# Patient Record
Sex: FEMALE | Race: WHITE | NOT HISPANIC OR LATINO | Employment: FULL TIME | URBAN - METROPOLITAN AREA
[De-identification: names, ages, dates, MRNs, and addresses within clinical notes are randomized per-mention and may not be internally consistent; named-entity substitution may affect disease eponyms.]

---

## 2023-09-19 ENCOUNTER — OFFICE VISIT (OUTPATIENT)
Dept: PRIMARY CARE | Facility: CLINIC | Age: 33
End: 2023-09-19
Payer: COMMERCIAL

## 2023-09-19 VITALS
OXYGEN SATURATION: 98 % | DIASTOLIC BLOOD PRESSURE: 70 MMHG | RESPIRATION RATE: 16 BRPM | HEART RATE: 69 BPM | HEIGHT: 67 IN | WEIGHT: 149 LBS | TEMPERATURE: 98 F | BODY MASS INDEX: 23.39 KG/M2 | SYSTOLIC BLOOD PRESSURE: 128 MMHG

## 2023-09-19 DIAGNOSIS — Z00.00 HEALTHCARE MAINTENANCE: Primary | ICD-10-CM

## 2023-09-19 PROBLEM — G89.29 CHRONIC LOW BACK PAIN: Status: ACTIVE | Noted: 2023-09-19

## 2023-09-19 PROBLEM — L29.9 PRURITUS: Status: RESOLVED | Noted: 2023-09-19 | Resolved: 2023-09-19

## 2023-09-19 PROBLEM — R53.83 FATIGUE: Status: ACTIVE | Noted: 2023-09-19
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
## 2023-09-19 PROBLEM — R51.9 FREQUENT HEADACHES: Status: ACTIVE | Noted: 2023-09-19

## 2023-09-19 PROBLEM — F41.8 DEPRESSION WITH ANXIETY: Status: ACTIVE | Noted: 2023-09-19

## 2023-09-19 PROBLEM — E55.9 VITAMIN D DEFICIENCY: Status: ACTIVE | Noted: 2023-09-19

## 2023-09-19 PROBLEM — R74.01 TRANSAMINITIS: Status: ACTIVE | Noted: 2023-09-19

## 2023-09-19 PROBLEM — S82.899A ANKLE FRACTURE: Status: RESOLVED | Noted: 2023-09-19 | Resolved: 2023-09-19

## 2023-09-19 PROBLEM — R74.01 TRANSAMINITIS: Status: RESOLVED | Noted: 2023-09-19 | Resolved: 2023-09-19

## 2023-09-19 PROBLEM — M85.80 OSTEOPENIA: Status: ACTIVE | Noted: 2023-09-19

## 2023-09-19 PROBLEM — S92.333A FRACTURE OF 3RD METATARSAL: Status: ACTIVE | Noted: 2023-09-19

## 2023-09-19 PROBLEM — L29.9 ITCHING: Status: RESOLVED | Noted: 2023-09-19 | Resolved: 2023-09-19

## 2023-09-19 PROBLEM — J10.1 INFLUENZA A: Status: RESOLVED | Noted: 2023-09-19 | Resolved: 2023-09-19

## 2023-09-19 PROBLEM — J30.9 ALLERGIC RHINITIS: Status: ACTIVE | Noted: 2023-09-19

## 2023-09-19 PROBLEM — S82.899A ANKLE FRACTURE: Status: ACTIVE | Noted: 2023-09-19

## 2023-09-19 PROBLEM — K83.1: Status: ACTIVE | Noted: 2023-09-19

## 2023-09-19 PROBLEM — M79.673 FOOT PAIN: Status: RESOLVED | Noted: 2023-09-19 | Resolved: 2023-09-19

## 2023-09-19 PROBLEM — R11.0 NAUSEA IN ADULT: Status: RESOLVED | Noted: 2023-09-19 | Resolved: 2023-09-19

## 2023-09-19 PROBLEM — M25.671 STIFFNESS OF RIGHT ANKLE JOINT: Status: ACTIVE | Noted: 2023-09-19

## 2023-09-19 PROBLEM — M54.2 NECK PAIN: Status: ACTIVE | Noted: 2023-09-19

## 2023-09-19 PROBLEM — R79.89 ELEVATED LFTS: Status: RESOLVED | Noted: 2023-09-19 | Resolved: 2023-09-19

## 2023-09-19 PROBLEM — J45.909 ASTHMA (HHS-HCC): Status: ACTIVE | Noted: 2023-09-19

## 2023-09-19 PROBLEM — F41.8 DEPRESSION WITH ANXIETY: Status: RESOLVED | Noted: 2023-09-19 | Resolved: 2023-09-19
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
## 2023-09-19 PROBLEM — M25.671 STIFFNESS OF RIGHT ANKLE JOINT: Status: RESOLVED | Noted: 2023-09-19 | Resolved: 2023-09-19

## 2023-09-19 PROBLEM — S93.409A ANKLE SPRAIN: Status: RESOLVED | Noted: 2023-09-19 | Resolved: 2023-09-19

## 2023-09-19 PROBLEM — M85.80 OSTEOPENIA: Status: RESOLVED | Noted: 2023-09-19 | Resolved: 2023-09-19

## 2023-09-19 PROBLEM — J02.9 VIRAL PHARYNGITIS: Status: RESOLVED | Noted: 2023-09-19 | Resolved: 2023-09-19

## 2023-09-19 PROBLEM — R79.89 ELEVATED LFTS: Status: ACTIVE | Noted: 2023-09-19

## 2023-09-19 PROBLEM — S92.333A FRACTURE OF 3RD METATARSAL: Status: RESOLVED | Noted: 2023-09-19 | Resolved: 2023-09-19

## 2023-09-19 PROBLEM — J02.9 VIRAL PHARYNGITIS: Status: ACTIVE | Noted: 2023-09-19

## 2023-09-19 PROBLEM — K83.1: Status: RESOLVED | Noted: 2023-09-19 | Resolved: 2023-09-19

## 2023-09-19 PROBLEM — L29.9 ITCHING: Status: ACTIVE | Noted: 2023-09-19

## 2023-09-19 PROBLEM — L29.9 PRURITUS: Status: ACTIVE | Noted: 2023-09-19

## 2023-09-19 PROBLEM — N94.6 DYSMENORRHEA: Status: ACTIVE | Noted: 2023-09-19

## 2023-09-19 PROBLEM — L30.9 ECZEMA: Status: ACTIVE | Noted: 2023-09-19

## 2023-09-19 PROBLEM — M54.50 CHRONIC LOW BACK PAIN: Status: ACTIVE | Noted: 2023-09-19

## 2023-09-19 PROBLEM — R11.0 NAUSEA IN ADULT: Status: ACTIVE | Noted: 2023-09-19

## 2023-09-19 PROBLEM — R68.89 FLU-LIKE SYMPTOMS: Status: ACTIVE | Noted: 2023-09-19

## 2023-09-19 PROBLEM — M25.519 SHOULDER PAIN: Status: RESOLVED | Noted: 2023-09-19 | Resolved: 2023-09-19

## 2023-09-19 PROBLEM — J01.90 ACUTE SINUSITIS: Status: ACTIVE | Noted: 2023-09-19

## 2023-09-19 PROBLEM — F41.9 ANXIETY: Status: ACTIVE | Noted: 2023-09-19

## 2023-09-19 PROBLEM — M62.838 MUSCLE SPASM: Status: ACTIVE | Noted: 2023-09-19

## 2023-09-19 PROBLEM — J01.90 ACUTE SINUSITIS: Status: RESOLVED | Noted: 2023-09-19 | Resolved: 2023-09-19

## 2023-09-19 PROBLEM — J30.9 ALLERGIC RHINITIS: Status: RESOLVED | Noted: 2023-09-19 | Resolved: 2023-09-19

## 2023-09-19 PROBLEM — M54.2 NECK PAIN: Status: RESOLVED | Noted: 2023-09-19 | Resolved: 2023-09-19

## 2023-09-19 PROBLEM — J10.1 INFLUENZA A: Status: ACTIVE | Noted: 2023-09-19

## 2023-09-19 PROBLEM — S93.409A ANKLE SPRAIN: Status: ACTIVE | Noted: 2023-09-19

## 2023-09-19 PROBLEM — M25.519 SHOULDER PAIN: Status: ACTIVE | Noted: 2023-09-19

## 2023-09-19 PROBLEM — M79.673 FOOT PAIN: Status: ACTIVE | Noted: 2023-09-19

## 2023-09-19 PROBLEM — R68.89 FLU-LIKE SYMPTOMS: Status: RESOLVED | Noted: 2023-09-19 | Resolved: 2023-09-19

## 2023-09-19 PROCEDURE — 99395 PREV VISIT EST AGE 18-39: CPT | Performed by: INTERNAL MEDICINE

## 2023-09-19 PROCEDURE — 1036F TOBACCO NON-USER: CPT | Performed by: INTERNAL MEDICINE

## 2023-09-19 RX ORDER — ALBUTEROL SULFATE 0.83 MG/ML
SOLUTION RESPIRATORY (INHALATION)
COMMUNITY

## 2023-09-19 RX ORDER — CHOLECALCIFEROL (VITAMIN D3) 50 MCG
50 TABLET ORAL DAILY
COMMUNITY

## 2023-09-19 RX ORDER — MULTIVITAMIN
1 TABLET ORAL DAILY
COMMUNITY

## 2023-09-19 RX ORDER — ALBUTEROL SULFATE 90 UG/1
AEROSOL, METERED RESPIRATORY (INHALATION)
COMMUNITY
Start: 2017-11-30

## 2023-09-19 RX ORDER — CYCLOBENZAPRINE HCL 10 MG
1 TABLET ORAL NIGHTLY PRN
COMMUNITY
Start: 2022-04-22

## 2023-09-19 ASSESSMENT — ENCOUNTER SYMPTOMS
SLEEP DISTURBANCE: 1
CONSTIPATION: 0
NERVOUS/ANXIOUS: 1
BACK PAIN: 1
DIARRHEA: 1
FREQUENCY: 0

## 2023-09-19 ASSESSMENT — PATIENT HEALTH QUESTIONNAIRE - PHQ9
2. FEELING DOWN, DEPRESSED OR HOPELESS: NOT AT ALL
SUM OF ALL RESPONSES TO PHQ9 QUESTIONS 1 AND 2: 0
1. LITTLE INTEREST OR PLEASURE IN DOING THINGS: NOT AT ALL

## 2023-09-19 NOTE — PROGRESS NOTES
Patient here for a physical     Subjective   Patient ID: Lindsay Mina is a 33 y.o. female who presents for Annual Exam.    The patient has been undergoing acupuncture therapy for anxiety symptoms and finds this is working very well.  Nevertheless, she wakes up occasionally with panic attacks, and states this is affecting her sleep quality.  In addition, the patient notes intermittent loose stools which she believes is related to the anxiety and stress.    The patient reports slowly improving back pain with occasional muscle spasms.  She suspects this is due to prolonged sitting at her desk.    The patient mentions occasional asthma flare-ups and allergy symptoms.  She has a prescription for albuterol 90 mcg/actuation inhaler which is used as needed.    The patient reports changes in visual acuity and plans to follow-up with Optometry for new prescription lenses.    The patient denies any urinary symptoms.    The patient often travels to [place] for work purposes.       Review of Systems   Eyes:         Positive for change in visual acuity.   Gastrointestinal:  Positive for diarrhea. Negative for constipation.   Genitourinary:  Negative for frequency.   Musculoskeletal:  Positive for back pain.   Psychiatric/Behavioral:  Positive for sleep disturbance. The patient is nervous/anxious.    All other systems reviewed and are negative.    Objective   Physical Exam  Constitutional:       Appearance: Normal appearance.   Neck:      Vascular: No carotid bruit.   Cardiovascular:      Rate and Rhythm: Normal rate and regular rhythm.      Heart sounds: Normal heart sounds.   Pulmonary:      Effort: Pulmonary effort is normal.      Breath sounds: Normal breath sounds.   Abdominal:      General: Bowel sounds are normal.      Palpations: Abdomen is soft.      Tenderness: There is no abdominal tenderness.   Skin:     General: Skin is warm and dry.   Neurological:      General: No focal deficit present.      Mental Status: She  is alert and oriented to person, place, and time. Mental status is at baseline.   Psychiatric:         Mood and Affect: Mood normal.         Behavior: Behavior normal.       Assessment/Plan   Problem List Items Addressed This Visit    None  Visit Diagnoses       Healthcare maintenance    -  Primary    Relevant Orders    Lipid panel    Comprehensive metabolic panel    CBC and Auto Differential    Tsh With Reflex To Free T4 If Abnormal            CPE Performed  -  Discussed healthy diet and regular exercise.    -  Physical exam overall unremarkable. Immunizations reviewed and updated accordingly. Healthy lifestyle choices discussed (tobacco avoidance, appropriate alcohol use, avoidance of illicit substances).   -  Patient is wearing seatbelt.   -  Screening lab work ordered as indicated.    -  Age appropriate screening tests reviewed with patient.        IMPRESSIONS/PLAN:    /70 in the office today.    Asthma  - Maintained with albuterol 90 mcg/actuation inhaler prn and albuterol 2.5mg/3ml nebulizer solution.       Muscle Spasm  - Maintained with cyclobenzaprine 10 mg at bedtime prn.    Health Maintenance  - Routine labs ordered including CBC, CMP, and a lipid panel to be completed in the fasting state.     Follow-up according to routine health maintenance, call sooner if needed.       Scribe Attestation  By signing my name below, IEufemia Scribe   attest that this documentation has been prepared under the direction and in the presence of Kee Dill DO.

## 2023-09-20 ENCOUNTER — LAB (OUTPATIENT)
Dept: LAB | Facility: LAB | Age: 33
End: 2023-09-20
Payer: COMMERCIAL

## 2023-09-20 DIAGNOSIS — Z00.00 HEALTHCARE MAINTENANCE: ICD-10-CM

## 2023-09-20 LAB
ALANINE AMINOTRANSFERASE (SGPT) (U/L) IN SER/PLAS: 12 U/L (ref 7–45)
ALBUMIN (G/DL) IN SER/PLAS: 4.6 G/DL (ref 3.4–5)
ALKALINE PHOSPHATASE (U/L) IN SER/PLAS: 49 U/L (ref 33–110)
ANION GAP IN SER/PLAS: 14 MMOL/L (ref 10–20)
ASPARTATE AMINOTRANSFERASE (SGOT) (U/L) IN SER/PLAS: 17 U/L (ref 9–39)
BASOPHILS (10*3/UL) IN BLOOD BY AUTOMATED COUNT: 0.03 X10E9/L (ref 0–0.1)
BASOPHILS/100 LEUKOCYTES IN BLOOD BY AUTOMATED COUNT: 0.7 % (ref 0–2)
BILIRUBIN TOTAL (MG/DL) IN SER/PLAS: 0.4 MG/DL (ref 0–1.2)
CALCIUM (MG/DL) IN SER/PLAS: 9.5 MG/DL (ref 8.6–10.3)
CARBON DIOXIDE, TOTAL (MMOL/L) IN SER/PLAS: 27 MMOL/L (ref 21–32)
CHLORIDE (MMOL/L) IN SER/PLAS: 104 MMOL/L (ref 98–107)
CHOLESTEROL (MG/DL) IN SER/PLAS: 194 MG/DL (ref 0–199)
CHOLESTEROL IN HDL (MG/DL) IN SER/PLAS: 53 MG/DL
CHOLESTEROL/HDL RATIO: 3.7
CREATININE (MG/DL) IN SER/PLAS: 0.79 MG/DL (ref 0.5–1.05)
EOSINOPHILS (10*3/UL) IN BLOOD BY AUTOMATED COUNT: 0.09 X10E9/L (ref 0–0.7)
EOSINOPHILS/100 LEUKOCYTES IN BLOOD BY AUTOMATED COUNT: 2 % (ref 0–6)
ERYTHROCYTE DISTRIBUTION WIDTH (RATIO) BY AUTOMATED COUNT: 13.5 % (ref 11.5–14.5)
ERYTHROCYTE MEAN CORPUSCULAR HEMOGLOBIN CONCENTRATION (G/DL) BY AUTOMATED: 32.3 G/DL (ref 32–36)
ERYTHROCYTE MEAN CORPUSCULAR VOLUME (FL) BY AUTOMATED COUNT: 90 FL (ref 80–100)
ERYTHROCYTES (10*6/UL) IN BLOOD BY AUTOMATED COUNT: 4.7 X10E12/L (ref 4–5.2)
GFR FEMALE: >90 ML/MIN/1.73M2
GLUCOSE (MG/DL) IN SER/PLAS: 80 MG/DL (ref 74–99)
HEMATOCRIT (%) IN BLOOD BY AUTOMATED COUNT: 42.1 % (ref 36–46)
HEMOGLOBIN (G/DL) IN BLOOD: 13.6 G/DL (ref 12–16)
IMMATURE GRANULOCYTES/100 LEUKOCYTES IN BLOOD BY AUTOMATED COUNT: 0 % (ref 0–0.9)
LDL: 126 MG/DL (ref 0–99)
LEUKOCYTES (10*3/UL) IN BLOOD BY AUTOMATED COUNT: 4.6 X10E9/L (ref 4.4–11.3)
LYMPHOCYTES (10*3/UL) IN BLOOD BY AUTOMATED COUNT: 2.1 X10E9/L (ref 1.2–4.8)
LYMPHOCYTES/100 LEUKOCYTES IN BLOOD BY AUTOMATED COUNT: 46.1 % (ref 13–44)
MONOCYTES (10*3/UL) IN BLOOD BY AUTOMATED COUNT: 0.36 X10E9/L (ref 0.1–1)
MONOCYTES/100 LEUKOCYTES IN BLOOD BY AUTOMATED COUNT: 7.9 % (ref 2–10)
NEUTROPHILS (10*3/UL) IN BLOOD BY AUTOMATED COUNT: 1.98 X10E9/L (ref 1.2–7.7)
NEUTROPHILS/100 LEUKOCYTES IN BLOOD BY AUTOMATED COUNT: 43.3 % (ref 40–80)
PLATELETS (10*3/UL) IN BLOOD AUTOMATED COUNT: 284 X10E9/L (ref 150–450)
POTASSIUM (MMOL/L) IN SER/PLAS: 3.9 MMOL/L (ref 3.5–5.3)
PROTEIN TOTAL: 7.5 G/DL (ref 6.4–8.2)
SODIUM (MMOL/L) IN SER/PLAS: 141 MMOL/L (ref 136–145)
THYROTROPIN (MIU/L) IN SER/PLAS BY DETECTION LIMIT <= 0.05 MIU/L: 2.13 MIU/L (ref 0.44–3.98)
TRIGLYCERIDE (MG/DL) IN SER/PLAS: 76 MG/DL (ref 0–149)
UREA NITROGEN (MG/DL) IN SER/PLAS: 11 MG/DL (ref 6–23)
VLDL: 15 MG/DL (ref 0–40)

## 2023-09-20 PROCEDURE — 80053 COMPREHEN METABOLIC PANEL: CPT

## 2023-09-20 PROCEDURE — 85025 COMPLETE CBC W/AUTO DIFF WBC: CPT

## 2023-09-20 PROCEDURE — 80061 LIPID PANEL: CPT

## 2023-09-20 PROCEDURE — 36415 COLL VENOUS BLD VENIPUNCTURE: CPT

## 2023-09-20 PROCEDURE — 84443 ASSAY THYROID STIM HORMONE: CPT

## 2023-10-06 ENCOUNTER — ALLIED HEALTH (OUTPATIENT)
Dept: INTEGRATIVE MEDICINE | Facility: CLINIC | Age: 33
End: 2023-10-06
Payer: COMMERCIAL

## 2023-10-06 DIAGNOSIS — M54.2 NECK PAIN, CHRONIC: ICD-10-CM

## 2023-10-06 DIAGNOSIS — G89.29 NECK PAIN, CHRONIC: ICD-10-CM

## 2023-10-06 DIAGNOSIS — M54.59 OTHER LOW BACK PAIN: Primary | ICD-10-CM

## 2023-10-06 PROCEDURE — 97810 ACUP 1/> WO ESTIM 1ST 15 MIN: CPT

## 2023-10-06 PROCEDURE — 97811 ACUP 1/> W/O ESTIM EA ADD 15: CPT

## 2023-10-06 NOTE — PROGRESS NOTES
"[facility]    cc: chronic low back pain, neck pain and anxiety     Update: 10/6/23  Tx 2/15    Patient reports she has been under a lot of stress at work to a point of \"picking her fingers\" and \"pulling her hair.\"  Neck and back spasms have also increased as a result.  She has taken Monday off to recover a bit but looking forward to taking a longer vacation.    Insurance visit 1 of 15 9/22/23     Patient reports she has been under a lot of stress lately due to work which has contributed to neck tightening. She also lost her dog of 13 years yesterday and feeling a bit sad. Additionally, she has been experiencing intermittent tingling from the middle of her right arm into her fingers. No anxiety lately, just stress primarily from work.        Initial intake 4/4/2022        Patient came in today seeking acupuncture for chronic low back pain, neck pain and anxiety.  Patient is a pleasant 30 y/o  who works from home in [place] who c/o chronic low back, neck, midthoracic pain with anxiety d/t stress.     Anxiety with stress: Wedding date 5/28/22 and new home under construction and living in this home. New job is demanding. Manifests itself as physical pain, diarrhea and headaches. Wedding plans are not going well.      Low back pain; 2-3/10 pain level today. Has had daily chronic low back pain for 15 years through all of her higher education. Lately she has noticed that her mid thoracic and neck pain have worsened. Right hip pain which is new and with an onset of January 2022. Yoga and Pilates help with upper back but not with low back pain. She is constantly at her computer as an  working daily for full-time work.     History of chronic bilateral knee pain, right foot fracture and pain and right rib fracture. Diagnosed with osteopenia at 31 years old. Her physicians are working with her with calcium and phosphorus as well as vitamin D.. RYLEE positive. Familial history of autoimmune with " "RA, lupus, degenerative disc disease and vasculitis.     PCOS diagnosis and has no fertility goals. Does not use OBC due to history of severe complications many years ago.     \"Has terrible allergies \".     Headaches: daily, (R)>(L) temporal and post orbital. Pain is pressure and deep ache. 0/10 presently and VILLALPANDO gets to 4/10 by day's end.      Dysmenorrhea: History of 15 years of menstrual cramps. This is cycle day 2 today with 8 out of 10 pain yesterday. Heat and naproxen helped reduce the pain so she was able to sleep. LMP: 4/3/2022. Heavy red bleeding for 1 to 2 days of cycle, tapers 2 to 3 days later. Cycles are 28 to 32 days averaging 30 days. Very floyd the week before the menstrual cycle begins.     Insomnia: hard to stay asleep, and at times hard to fall asleep, wakes with anxiety attacks and nightmares. Insomnia as part of the physical nature of her anxiety. The back pain and headaches with dysmenorrhea all are worse with increased anxiety.     Clonazepam prn and exercises daily at most with energy sufficient. Does exercise during meetings without having enough time. Yoga and PIlates.     Asthma: can get worse with aerobic exercise. Inhaler may be necessary.         "

## 2023-10-06 NOTE — PATIENT INSTRUCTIONS
Patient received a detailed treatment plan (1x/wk for 6-8wks) as well as what to expect following today's treatment (some soreness at treatment sites that should wear off within a day).  Pt. also received an explanation of acupuncture medical benefits remaining after today's treatment.  Patient was also encouraged to properly hydrate following today's treatment along with lifestyle improvement tips which included rest, stress and pain management.

## 2023-10-09 ENCOUNTER — ALLIED HEALTH (OUTPATIENT)
Dept: INTEGRATIVE MEDICINE | Facility: CLINIC | Age: 33
End: 2023-10-09
Payer: COMMERCIAL

## 2023-10-09 DIAGNOSIS — M54.2 NECK PAIN, CHRONIC: ICD-10-CM

## 2023-10-09 DIAGNOSIS — G89.29 NECK PAIN, CHRONIC: ICD-10-CM

## 2023-10-09 DIAGNOSIS — M54.59 OTHER LOW BACK PAIN: Primary | ICD-10-CM

## 2023-10-09 PROCEDURE — 97811 ACUP 1/> W/O ESTIM EA ADD 15: CPT | Performed by: ACUPUNCTURIST

## 2023-10-09 PROCEDURE — 97810 ACUP 1/> WO ESTIM 1ST 15 MIN: CPT | Performed by: ACUPUNCTURIST

## 2023-10-09 NOTE — PROGRESS NOTES
"Acupuncture Visit:     Subjective   Patient ID: Lindsay Mina is a 33 y.o. female who presents for Back Pain, Neck Pain, and Anxiety  HPI    Session Information  Is this acupuncture treatment being billed to the patient's insurance company: Yes  This is visit number: 3  The patient has a total number of visits of: 15  Initial Acupuncture Treatment date: 09/22/23  Last Treatment date: 10/06/23  Visit Type: Follow-up visit  Medical History Reviewed: I have reviewed pertinent medical history in EHR, and no contraindications are present to provide treatment    Insurance visit 3 of 15    Patient noted relief in her right shoulder after her last visit, noting that it \"feels great\"    She noted that the back of her neck is sore, around her occiput; she took the day off of work, but does still endorse headache which she describes as \"annoying\"    She is also experiencing some right-sided low back and hip pain    She also described her sleep as \"poor\", and that her stress has been very high      Updated intake:     Patient reports she has been under a lot of stress lately due to work which has contributed to neck tightening. She also lost her dog of 13 years yesterday and feeling a bit sad. Additionally, she has been experiencing intermittent tingling from the middle of her right arm into her fingers. No anxiety lately, just stress primarily from work.        Initial intake 4/4/2022     Patient came in today seeking acupuncture for chronic low back pain, neck pain and anxiety.  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     Anxiety with stress: Wedding date 5/28/22 and new home under construction and living in this home. New job is demanding. Manifests itself as physical pain, diarrhea and headaches. Wedding plans are not going well.      Low back pain; 2-3/10 pain level today. Has had daily chronic low back pain for 15 " "years through all of her higher education. Lately she has noticed that her mid thoracic and neck pain have worsened. Right hip pain which is new and with an onset of January 2022. Yoga and Pilates help with upper back but not with low back pain. She is constantly at her computer as an  working daily for full-time work.     History of chronic bilateral knee pain, right foot fracture and pain and right rib fracture. Diagnosed with osteopenia at 31 years old. Her physicians are working with her with calcium and phosphorus as well as vitamin D.. RYLEE positive. Familial history of autoimmune with RA, lupus, degenerative disc disease and vasculitis.     PCOS diagnosis and has no fertility goals. Does not use OBC due to history of severe complications many years ago.     \"Has terrible allergies \".     Headaches: daily, (R)>(L) temporal and post orbital. Pain is pressure and deep ache. 0/10 presently and VILLALPANDO gets to 4/10 by day's end.      Dysmenorrhea: History of 15 years of menstrual cramps. This is cycle day 2 today with 8 out of 10 pain yesterday. Heat and naproxen helped reduce the pain so she was able to sleep. LMP: 4/3/2022. Heavy red bleeding for 1 to 2 days of cycle, tapers 2 to 3 days later. Cycles are 28 to 32 days averaging 30 days. Very floyd the week before the menstrual cycle begins.     Insomnia: hard to stay asleep, and at times hard to fall asleep, wakes with anxiety attacks and nightmares. Insomnia as part of the physical nature of her anxiety. The back pain and headaches with dysmenorrhea all are worse with increased anxiety.     Clonazepam prn and exercises daily at most with energy sufficient. Does exercise during meetings without having enough time. Yoga and PIlates.     Asthma: can get worse with aerobic exercise. Inhaler may be necessary.          Review of Systems         Provider reviewed plan for the acupuncture session, precautions and contraindications. Patient/guardian/hospital staff has " given consent to treat with full understanding of what to expect during the session. Before acupuncture began, provider explained to the patient to communicate at any time if the procedure was causing discomfort past their tolerance level. Patient agreed to advise acupuncturist. The acupuncturist counseled the patient on the risks of acupuncture treatment including pain, infection, bleeding, and no relief of pain. The patient was positioned comfortably. There was no evidence of infection at the site of needle insertions.    Objective   Physical Exam  Constitutional:                                       Assessment/Plan

## 2023-10-09 NOTE — PATIENT INSTRUCTIONS
Frequency of visits: Recommend begin with 6-8 treatments, 1x/week, then re-evaluate for maintenance. Treatment recommendation may change depending on the severity and/or duration of symptoms.    Diet/lifestyle suggestions: Drink enough water over the next few days; apply heat as directed to affected areas    Please feel free to contact me with any questions or concerns      Please note: Dictation software was used while completing this note and may contain spelling, grammatical, and/or syntax errors.  Please contact me with any questions.    To clinicians, I am always happy to partner with you in the care of your patients. Do not hesitate to call the office or contact me directly regarding any further consultations or with questions regarding the plan of care outlined or patient progress.

## 2023-10-13 ENCOUNTER — ALLIED HEALTH (OUTPATIENT)
Dept: INTEGRATIVE MEDICINE | Facility: CLINIC | Age: 33
End: 2023-10-13
Payer: COMMERCIAL

## 2023-10-13 DIAGNOSIS — M54.59 OTHER LOW BACK PAIN: Primary | ICD-10-CM

## 2023-10-13 DIAGNOSIS — M54.2 NECK PAIN, CHRONIC: ICD-10-CM

## 2023-10-13 DIAGNOSIS — G89.29 NECK PAIN, CHRONIC: ICD-10-CM

## 2023-10-13 PROCEDURE — 97810 ACUP 1/> WO ESTIM 1ST 15 MIN: CPT

## 2023-10-13 PROCEDURE — 97811 ACUP 1/> W/O ESTIM EA ADD 15: CPT

## 2023-10-13 NOTE — PROGRESS NOTES
"[facility]    cc: chronic low back pain, neck pain and anxiety     Update:  Tx 4/15    Patient reports marked improvement in sx particularly in traps and hip pain.  She notes neck pain is still present due to continued stress from work.  Patient further notes she continues to experience hair shedding in addition to difficulty falling asleep.    Update: 10/6/23  Tx 2/15    Patient reports she has been under a lot of stress at work to a point of \"picking her fingers\" and \"pulling her hair.\"  Neck and back spasms have also increased as a result.  She has taken Monday off to recover a bit but looking forward to taking a longer vacation.    Insurance visit 1 of 15 9/22/23     Patient reports she has been under a lot of stress lately due to work which has contributed to neck tightening. She also lost her dog of 13 years yesterday and feeling a bit sad. Additionally, she has been experiencing intermittent tingling from the middle of her right arm into her fingers. No anxiety lately, just stress primarily from work.        Initial intake 4/4/2022        Patient came in today seeking acupuncture for chronic low back pain, neck pain and anxiety.  Patient is a pleasant 32 y/o  who works from home in [employer] who c/o chronic low back, neck, midthoracic pain with anxiety d/t stress.     Anxiety with stress: Wedding date 5/28/22 and new home under construction and living in this home. New job is demanding. Manifests itself as physical pain, diarrhea and headaches. Wedding plans are not going well.      Low back pain; 2-3/10 pain level today. Has had daily chronic low back pain for 15 years through all of her higher education. Lately she has noticed that her mid thoracic and neck pain have worsened. Right hip pain which is new and with an onset of January 2022. Yoga and Pilates help with upper back but not with low back pain. She is constantly at her computer as an  working daily for full-time " "work.     History of chronic bilateral knee pain, right foot fracture and pain and right rib fracture. Diagnosed with osteopenia at 31 years old. Her physicians are working with her with calcium and phosphorus as well as vitamin D.. RYLEE positive. Familial history of autoimmune with RA, lupus, degenerative disc disease and vasculitis.     PCOS diagnosis and has no fertility goals. Does not use OBC due to history of severe complications many years ago.     \"Has terrible allergies \".     Headaches: daily, (R)>(L) temporal and post orbital. Pain is pressure and deep ache. 0/10 presently and VILLALPANDO gets to 4/10 by day's end.      Dysmenorrhea: History of 15 years of menstrual cramps. This is cycle day 2 today with 8 out of 10 pain yesterday. Heat and naproxen helped reduce the pain so she was able to sleep. LMP: 4/3/2022. Heavy red bleeding for 1 to 2 days of cycle, tapers 2 to 3 days later. Cycles are 28 to 32 days averaging 30 days. Very floyd the week before the menstrual cycle begins.     Insomnia: hard to stay asleep, and at times hard to fall asleep, wakes with anxiety attacks and nightmares. Insomnia as part of the physical nature of her anxiety. The back pain and headaches with dysmenorrhea all are worse with increased anxiety.     Clonazepam prn and exercises daily at most with energy sufficient. Does exercise during meetings without having enough time. Yoga and PIlates.     Asthma: can get worse with aerobic exercise. Inhaler may be necessary.         "

## 2023-10-20 ENCOUNTER — APPOINTMENT (OUTPATIENT)
Dept: INTEGRATIVE MEDICINE | Facility: CLINIC | Age: 33
End: 2023-10-20
Payer: COMMERCIAL

## 2023-10-27 ENCOUNTER — ALLIED HEALTH (OUTPATIENT)
Dept: INTEGRATIVE MEDICINE | Facility: CLINIC | Age: 33
End: 2023-10-27
Payer: COMMERCIAL

## 2023-10-27 DIAGNOSIS — G89.29 NECK PAIN, CHRONIC: ICD-10-CM

## 2023-10-27 DIAGNOSIS — M54.59 OTHER LOW BACK PAIN: Primary | ICD-10-CM

## 2023-10-27 DIAGNOSIS — M54.2 NECK PAIN, CHRONIC: ICD-10-CM

## 2023-10-27 PROCEDURE — 97814 ACUP 1/> W/ESTIM EA ADDL 15: CPT

## 2023-10-27 PROCEDURE — 97813 ACUP 1/> W/ESTIM 1ST 15 MIN: CPT

## 2023-10-27 NOTE — PROGRESS NOTES
"[facility] [employer]    cc: chronic low back pain, neck pain and anxiety     Update: 10/27/23  Tx 5/15    Patient reports being in under a lot of stress at work causing her body to tense up particularly her neck and upper back.  She states \"it feels like my shoulders are up to my ears.\"  Patient express worry that her boss is gearing to let her go which raises her stress levels more.  She also notes she has been waking up to sharp bottom of the foot pain that usually gets better once she starts moving.  No headaches but hair continues to fall out the more she is stressed.    Update: 10/13/23  Tx 4/15    Patient reports marked improvement in sx particularly in traps and hip pain.  She notes neck pain is still present due to continued stress from work.  Patient further notes she continues to experience hair shedding in addition to difficulty falling asleep.    Update: 10/6/23  Tx 2/15    Patient reports she has been under a lot of stress at work to a point of \"picking her fingers\" and \"pulling her hair.\"  Neck and back spasms have also increased as a result.  She has taken Monday off to recover a bit but looking forward to taking a longer vacation.    Insurance visit 1 of 15 9/22/23     Patient reports she has been under a lot of stress lately due to work which has contributed to neck tightening. She also lost her dog of 13 years yesterday and feeling a bit sad. Additionally, she has been experiencing intermittent tingling from the middle of her right arm into her fingers. No anxiety lately, just stress primarily from work.        Initial intake 4/4/2022        Patient came in today seeking acupuncture for chronic low back pain, neck pain and anxiety.  Patient is a pleasant 30 y/o  who works from home in [place] who c/o chronic low back, neck, midthoracic pain with anxiety d/t stress.     Anxiety with stress: Wedding date 5/28/22 and new home under construction and living in this home. New job is " "demanding. Manifests itself as physical pain, diarrhea and headaches. Wedding plans are not going well.      Low back pain; 2-3/10 pain level today. Has had daily chronic low back pain for 15 years through all of her higher education. Lately she has noticed that her mid thoracic and neck pain have worsened. Right hip pain which is new and with an onset of January 2022. Yoga and Pilates help with upper back but not with low back pain. She is constantly at her computer as an  working daily for full-time work.     History of chronic bilateral knee pain, right foot fracture and pain and right rib fracture. Diagnosed with osteopenia at 31 years old. Her physicians are working with her with calcium and phosphorus as well as vitamin D.. RYLEE positive. Familial history of autoimmune with RA, lupus, degenerative disc disease and vasculitis.     PCOS diagnosis and has no fertility goals. Does not use OBC due to history of severe complications many years ago.     \"Has terrible allergies \".     Headaches: daily, (R)>(L) temporal and post orbital. Pain is pressure and deep ache. 0/10 presently and VILLALPANDO gets to 4/10 by day's end.      Dysmenorrhea: History of 15 years of menstrual cramps. This is cycle day 2 today with 8 out of 10 pain yesterday. Heat and naproxen helped reduce the pain so she was able to sleep. LMP: 4/3/2022. Heavy red bleeding for 1 to 2 days of cycle, tapers 2 to 3 days later. Cycles are 28 to 32 days averaging 30 days. Very floyd the week before the menstrual cycle begins.     Insomnia: hard to stay asleep, and at times hard to fall asleep, wakes with anxiety attacks and nightmares. Insomnia as part of the physical nature of her anxiety. The back pain and headaches with dysmenorrhea all are worse with increased anxiety.     Clonazepam prn and exercises daily at most with energy sufficient. Does exercise during meetings without having enough time. Yoga and PIlates.     Asthma: can get worse with aerobic " exercise. Inhaler may be necessary.

## 2023-11-03 ENCOUNTER — ALLIED HEALTH (OUTPATIENT)
Dept: INTEGRATIVE MEDICINE | Facility: CLINIC | Age: 33
End: 2023-11-03
Payer: COMMERCIAL

## 2023-11-03 DIAGNOSIS — G89.29 NECK PAIN, CHRONIC: ICD-10-CM

## 2023-11-03 DIAGNOSIS — M54.59 OTHER LOW BACK PAIN: Primary | ICD-10-CM

## 2023-11-03 DIAGNOSIS — M54.2 NECK PAIN, CHRONIC: ICD-10-CM

## 2023-11-03 PROCEDURE — 97813 ACUP 1/> W/ESTIM 1ST 15 MIN: CPT

## 2023-11-03 PROCEDURE — 97814 ACUP 1/> W/ESTIM EA ADDL 15: CPT

## 2023-11-03 NOTE — PROGRESS NOTES
"[facility]    cc: chronic low back pain, neck pain and anxiety     Update: 11/03/23  Tx 6/15    Patient reports pain in neck and low back are a lot better and feels she is able to do some exercises without the usual pain in the low back.  She notes she is currently experiencing some PMS sx including irritability and bloating.      Update: 10/27/23  Tx 5/15    Patient reports being in under a lot of stress at work causing her body to tense up particularly her neck and upper back.  She states \"it feels like my shoulders are up to my ears.\"  Patient express worry that her boss is gearing to let her go which raises her stress levels more.  She also notes she has been waking up to sharp bottom of the foot pain that usually gets better once she starts moving.  No headaches but hair continues to fall out the more she is stressed.    Update: 10/13/23  Tx 4/15    Patient reports marked improvement in sx particularly in traps and hip pain.  She notes neck pain is still present due to continued stress from work.  Patient further notes she continues to experience hair shedding in addition to difficulty falling asleep.    Update: 10/6/23  Tx 2/15    Patient reports she has been under a lot of stress at work to a point of \"picking her fingers\" and \"pulling her hair.\"  Neck and back spasms have also increased as a result.  She has taken Monday off to recover a bit but looking forward to taking a longer vacation.    Insurance visit 1 of 15 9/22/23     Patient reports she has been under a lot of stress lately due to work which has contributed to neck tightening. She also lost her dog of 13 years yesterday and feeling a bit sad. Additionally, she has been experiencing intermittent tingling from the middle of her right arm into her fingers. No anxiety lately, just stress primarily from work.        Initial intake 4/4/2022        Patient came in today seeking acupuncture for chronic low back pain, neck pain and " "anxiety.  Patient is a pleasant 30 y/o  who works from home in [employer] who c/o chronic low back, neck, midthoracic pain with anxiety d/t stress.     Anxiety with stress: Wedding date 5/28/22 and new home under construction and living in this home. New job is demanding. Manifests itself as physical pain, diarrhea and headaches. Wedding plans are not going well.      Low back pain; 2-3/10 pain level today. Has had daily chronic low back pain for 15 years through all of her higher education. Lately she has noticed that her mid thoracic and neck pain have worsened. Right hip pain which is new and with an onset of January 2022. Yoga and Pilates help with upper back but not with low back pain. She is constantly at her computer as an  working daily for full-time work.     History of chronic bilateral knee pain, right foot fracture and pain and right rib fracture. Diagnosed with osteopenia at 31 years old. Her physicians are working with her with calcium and phosphorus as well as vitamin D.. RYLEE positive. Familial history of autoimmune with RA, lupus, degenerative disc disease and vasculitis.     PCOS diagnosis and has no fertility goals. Does not use OBC due to history of severe complications many years ago.     \"Has terrible allergies \".     Headaches: daily, (R)>(L) temporal and post orbital. Pain is pressure and deep ache. 0/10 presently and VILLALPANDO gets to 4/10 by day's end.      Dysmenorrhea: History of 15 years of menstrual cramps. This is cycle day 2 today with 8 out of 10 pain yesterday. Heat and naproxen helped reduce the pain so she was able to sleep. LMP: 4/3/2022. Heavy red bleeding for 1 to 2 days of cycle, tapers 2 to 3 days later. Cycles are 28 to 32 days averaging 30 days. Very floyd the week before the menstrual cycle begins.     Insomnia: hard to stay asleep, and at times hard to fall asleep, wakes with anxiety attacks and nightmares. Insomnia as part of the physical nature of her " anxiety. The back pain and headaches with dysmenorrhea all are worse with increased anxiety.     Clonazepam prn and exercises daily at most with energy sufficient. Does exercise during meetings without having enough time. Yoga and PIlates.     Asthma: can get worse with aerobic exercise. Inhaler may be necessary.

## 2023-11-09 ENCOUNTER — APPOINTMENT (OUTPATIENT)
Dept: INTEGRATIVE MEDICINE | Facility: CLINIC | Age: 33
End: 2023-11-09
Payer: COMMERCIAL

## 2023-11-17 ENCOUNTER — ALLIED HEALTH (OUTPATIENT)
Dept: INTEGRATIVE MEDICINE | Facility: CLINIC | Age: 33
End: 2023-11-17
Payer: COMMERCIAL

## 2023-11-17 DIAGNOSIS — M54.59 OTHER LOW BACK PAIN: ICD-10-CM

## 2023-11-17 DIAGNOSIS — M54.2 NECK PAIN, CHRONIC: Primary | ICD-10-CM

## 2023-11-17 DIAGNOSIS — G89.29 NECK PAIN, CHRONIC: Primary | ICD-10-CM

## 2023-11-17 PROCEDURE — 97814 ACUP 1/> W/ESTIM EA ADDL 15: CPT

## 2023-11-17 PROCEDURE — 97813 ACUP 1/> W/ESTIM 1ST 15 MIN: CPT

## 2023-11-17 NOTE — PROGRESS NOTES
"[facility]    cc: chronic low back pain, neck pain and anxiety     Update: 11/17/23  Tx 7/15    Patient reports she slept for 3 nights without difficulty following last treatment.  She also notes low back pain has been feeling a lot better.  Patient states she continues to be under a lot of stress at work which impacts her shoulders and neck.  She notes she is looking forward to a long break from work coming up in December.    Update: 11/03/23  Tx 6/15    Patient reports pain in neck and low back are a lot better and feels she is able to do some exercises without the usual pain in the low back.  She notes she is currently experiencing some PMS sx including irritability and bloating.      Update: 10/27/23  Tx 5/15    Patient reports being in under a lot of stress at work causing her body to tense up particularly her neck and upper back.  She states \"it feels like my shoulders are up to my ears.\"  Patient express worry that her boss is gearing to let her go which raises her stress levels more.  She also notes she has been waking up to sharp bottom of the foot pain that usually gets better once she starts moving.  No headaches but hair continues to fall out the more she is stressed.    Update: 10/13/23  Tx 4/15    Patient reports marked improvement in sx particularly in traps and hip pain.  She notes neck pain is still present due to continued stress from work.  Patient further notes she continues to experience hair shedding in addition to difficulty falling asleep.    Update: 10/6/23  Tx 2/15    Patient reports she has been under a lot of stress at work to a point of \"picking her fingers\" and \"pulling her hair.\"  Neck and back spasms have also increased as a result.  She has taken Monday off to recover a bit but looking forward to taking a longer vacation.    Insurance visit 1 of 15 9/22/23     Patient reports she has been under a lot of stress lately due to work which has contributed to neck tightening. " "She also lost her dog of 13 years yesterday and feeling a bit sad. Additionally, she has been experiencing intermittent tingling from the middle of her right arm into her fingers. No anxiety lately, just stress primarily from work.        Initial intake 4/4/2022        Patient came in today seeking acupuncture for chronic low back pain, neck pain and anxiety.  Patient is a pleasant 32 y/o  who works from home in [employer] who c/o chronic low back, neck, midthoracic pain with anxiety d/t stress.     Anxiety with stress: Wedding date 5/28/22 and new home under construction and living in this home. New job is demanding. Manifests itself as physical pain, diarrhea and headaches. Wedding plans are not going well.      Low back pain; 2-3/10 pain level today. Has had daily chronic low back pain for 15 years through all of her higher education. Lately she has noticed that her mid thoracic and neck pain have worsened. Right hip pain which is new and with an onset of January 2022. Yoga and Pilates help with upper back but not with low back pain. She is constantly at her computer as an  working daily for full-time work.     History of chronic bilateral knee pain, right foot fracture and pain and right rib fracture. Diagnosed with osteopenia at 31 years old. Her physicians are working with her with calcium and phosphorus as well as vitamin D.. RYLEE positive. Familial history of autoimmune with RA, lupus, degenerative disc disease and vasculitis.     PCOS diagnosis and has no fertility goals. Does not use OBC due to history of severe complications many years ago.     \"Has terrible allergies \".     Headaches: daily, (R)>(L) temporal and post orbital. Pain is pressure and deep ache. 0/10 presently and VILLALPANDO gets to 4/10 by day's end.      Dysmenorrhea: History of 15 years of menstrual cramps. This is cycle day 2 today with 8 out of 10 pain yesterday. Heat and naproxen helped reduce the pain so she was able to " "sleep. LMP: 4/3/2022. Heavy red bleeding for 1 to 2 days of cycle, tapers 2 to 3 days later. Cycles are 28 to 32 days averaging 30 days. Very floyd the week before the menstrual cycle begins.     Insomnia: hard to stay asleep, and at times hard to fall asleep, wakes with anxiety attacks and nightmares. Insomnia as part of the physical nature of her anxiety. The back pain and headaches with dysmenorrhea all are worse with increased anxiety.     Clonazepam prn and exercises daily at most with energy sufficient. Does exercise during meetings without having enough time. Yoga and PIlates.     Asthma: can get worse with aerobic exercise. Inhaler may be necessary.       [facility]    cc: chronic low back pain, neck pain and anxiety     Update: 11/03/23  Tx 6/15    Patient reports pain in neck and low back are a lot better and feels she is able to do some exercises without the usual pain in the low back.  She notes she is currently experiencing some PMS sx including irritability and bloating.      Update: 10/27/23  Tx 5/15    Patient reports being in under a lot of stress at work causing her body to tense up particularly her neck and upper back.  She states \"it feels like my shoulders are up to my ears.\"  Patient express worry that her boss is gearing to let her go which raises her stress levels more.  She also notes she has been waking up to sharp bottom of the foot pain that usually gets better once she starts moving.  No headaches but hair continues to fall out the more she is stressed.    Update: 10/13/23  Tx 4/15    Patient reports marked improvement in sx particularly in traps and hip pain.  She notes neck pain is still present due to continued stress from work.  Patient further notes she continues to experience hair shedding in addition to difficulty falling asleep.    Update: 10/6/23  Tx 2/15    Patient reports she has been under a lot of stress at work to a point of \"picking her fingers\" and \"pulling " "her hair.\"  Neck and back spasms have also increased as a result.  She has taken Monday off to recover a bit but looking forward to taking a longer vacation.    Insurance visit 1 of 15 9/22/23     Patient reports she has been under a lot of stress lately due to work which has contributed to neck tightening. She also lost her dog of 13 years yesterday and feeling a bit sad. Additionally, she has been experiencing intermittent tingling from the middle of her right arm into her fingers. No anxiety lately, just stress primarily from work.        Initial intake 4/4/2022        Patient came in today seeking acupuncture for chronic low back pain, neck pain and anxiety.  Patient is a pleasant 32 y/o  who works from home in [employer] who c/o chronic low back, neck, midthoracic pain with anxiety d/t stress.     Anxiety with stress: Wedding date 5/28/22 and new home under construction and living in this home. New job is demanding. Manifests itself as physical pain, diarrhea and headaches. Wedding plans are not going well.      Low back pain; 2-3/10 pain level today. Has had daily chronic low back pain for 15 years through all of her higher education. Lately she has noticed that her mid thoracic and neck pain have worsened. Right hip pain which is new and with an onset of January 2022. Yoga and Pilates help with upper back but not with low back pain. She is constantly at her computer as an  working daily for full-time work.     History of chronic bilateral knee pain, right foot fracture and pain and right rib fracture. Diagnosed with osteopenia at 31 years old. Her physicians are working with her with calcium and phosphorus as well as vitamin D.. RYLEE positive. Familial history of autoimmune with RA, lupus, degenerative disc disease and vasculitis.     PCOS diagnosis and has no fertility goals. Does not use OBC due to history of severe complications many years ago.     \"Has terrible allergies \".   "   Headaches: daily, (R)>(L) temporal and post orbital. Pain is pressure and deep ache. 0/10 presently and VILLALPANDO gets to 4/10 by day's end.      Dysmenorrhea: History of 15 years of menstrual cramps. This is cycle day 2 today with 8 out of 10 pain yesterday. Heat and naproxen helped reduce the pain so she was able to sleep. LMP: 4/3/2022. Heavy red bleeding for 1 to 2 days of cycle, tapers 2 to 3 days later. Cycles are 28 to 32 days averaging 30 days. Very floyd the week before the menstrual cycle begins.     Insomnia: hard to stay asleep, and at times hard to fall asleep, wakes with anxiety attacks and nightmares. Insomnia as part of the physical nature of her anxiety. The back pain and headaches with dysmenorrhea all are worse with increased anxiety.     Clonazepam prn and exercises daily at most with energy sufficient. Does exercise during meetings without having enough time. Yoga and PIlates.     Asthma: can get worse with aerobic exercise. Inhaler may be necessary.

## 2023-11-20 ENCOUNTER — ALLIED HEALTH (OUTPATIENT)
Dept: INTEGRATIVE MEDICINE | Facility: CLINIC | Age: 33
End: 2023-11-20
Payer: COMMERCIAL

## 2023-11-20 DIAGNOSIS — G89.29 NECK PAIN, CHRONIC: Primary | ICD-10-CM

## 2023-11-20 DIAGNOSIS — M54.2 NECK PAIN, CHRONIC: Primary | ICD-10-CM

## 2023-11-20 DIAGNOSIS — M54.59 OTHER LOW BACK PAIN: ICD-10-CM

## 2023-11-20 PROCEDURE — 97811 ACUP 1/> W/O ESTIM EA ADD 15: CPT

## 2023-11-20 PROCEDURE — 97810 ACUP 1/> WO ESTIM 1ST 15 MIN: CPT

## 2023-11-20 NOTE — PROGRESS NOTES
"[facility]    cc: chronic low back pain, neck pain and anxiety     Update: 11/20/23  Tx 8/15    Sleep improve - wakes up once per night instead of the difficulty falling asleep  Neck -  a lot more easy to move without pain, but pain started returning today  Low back - continues to do better  Headaches - still frequent from back of head to vertex region  Currently experiencing facial pain below her eyes.  Pain feels sharp and achy.    Update: 11/17/23  Tx 7/15    Patient reports she slept for 3 nights without difficulty following last treatment.  She also notes low back pain has been feeling a lot better.  Patient states she continues to be under a lot of stress at work which impacts her shoulders and neck.  She notes she is looking forward to a long break from work coming up in December.    Update: 11/03/23  Tx 6/15    Patient reports pain in neck and low back are a lot better and feels she is able to do some exercises without the usual pain in the low back.  She notes she is currently experiencing some PMS sx including irritability and bloating.      Update: 10/27/23  Tx 5/15    Patient reports being in under a lot of stress at work causing her body to tense up particularly her neck and upper back.  She states \"it feels like my shoulders are up to my ears.\"  Patient express worry that her boss is gearing to let her go which raises her stress levels more.  She also notes she has been waking up to sharp bottom of the foot pain that usually gets better once she starts moving.  No headaches but hair continues to fall out the more she is stressed.    Update: 10/13/23  Tx 4/15    Patient reports marked improvement in sx particularly in traps and hip pain.  She notes neck pain is still present due to continued stress from work.  Patient further notes she continues to experience hair shedding in addition to difficulty falling asleep.    Update: 10/6/23  Tx 2/15    Patient reports she has been under a lot of " "stress at work to a point of \"picking her fingers\" and \"pulling her hair.\"  Neck and back spasms have also increased as a result.  She has taken Monday off to recover a bit but looking forward to taking a longer vacation.    Insurance visit 1 of 15 9/22/23     Patient reports she has been under a lot of stress lately due to work which has contributed to neck tightening. She also lost her dog of 13 years yesterday and feeling a bit sad. Additionally, she has been experiencing intermittent tingling from the middle of her right arm into her fingers. No anxiety lately, just stress primarily from work.        Initial intake 4/4/2022        Patient came in today seeking acupuncture for chronic low back pain, neck pain and anxiety.  Patient is a pleasant 30 y/o  who works from home in [employer] who c/o chronic low back, neck, midthoracic pain with anxiety d/t stress.     Anxiety with stress: Wedding date 5/28/22 and new home under construction and living in this home. New job is demanding. Manifests itself as physical pain, diarrhea and headaches. Wedding plans are not going well.      Low back pain; 2-3/10 pain level today. Has had daily chronic low back pain for 15 years through all of her higher education. Lately she has noticed that her mid thoracic and neck pain have worsened. Right hip pain which is new and with an onset of January 2022. Yoga and Pilates help with upper back but not with low back pain. She is constantly at her computer as an  working daily for full-time work.     History of chronic bilateral knee pain, right foot fracture and pain and right rib fracture. Diagnosed with osteopenia at 31 years old. Her physicians are working with her with calcium and phosphorus as well as vitamin D.. RYLEE positive. Familial history of autoimmune with RA, lupus, degenerative disc disease and vasculitis.     PCOS diagnosis and has no fertility goals. Does not use OBC due to history of severe " "complications many years ago.     \"Has terrible allergies \".     Headaches: daily, (R)>(L) temporal and post orbital. Pain is pressure and deep ache. 0/10 presently and VILLALPANDO gets to 4/10 by day's end.      Dysmenorrhea: History of 15 years of menstrual cramps. This is cycle day 2 today with 8 out of 10 pain yesterday. Heat and naproxen helped reduce the pain so she was able to sleep. LMP: 4/3/2022. Heavy red bleeding for 1 to 2 days of cycle, tapers 2 to 3 days later. Cycles are 28 to 32 days averaging 30 days. Very floyd the week before the menstrual cycle begins.     Insomnia: hard to stay asleep, and at times hard to fall asleep, wakes with anxiety attacks and nightmares. Insomnia as part of the physical nature of her anxiety. The back pain and headaches with dysmenorrhea all are worse with increased anxiety.     Clonazepam prn and exercises daily at most with energy sufficient. Does exercise during meetings without having enough time. Yoga and PIlates.     Asthma: can get worse with aerobic exercise. Inhaler may be necessary.       [facility]    cc: chronic low back pain, neck pain and anxiety     Update: 11/03/23  Tx 6/15    Patient reports pain in neck and low back are a lot better and feels she is able to do some exercises without the usual pain in the low back.  She notes she is currently experiencing some PMS sx including irritability and bloating.      Update: 10/27/23  Tx 5/15    Patient reports being in under a lot of stress at work causing her body to tense up particularly her neck and upper back.  She states \"it feels like my shoulders are up to my ears.\"  Patient express worry that her boss is gearing to let her go which raises her stress levels more.  She also notes she has been waking up to sharp bottom of the foot pain that usually gets better once she starts moving.  No headaches but hair continues to fall out the more she is stressed.    Update: 10/13/23  Tx 4/15    Patient reports " "marked improvement in sx particularly in traps and hip pain.  She notes neck pain is still present due to continued stress from work.  Patient further notes she continues to experience hair shedding in addition to difficulty falling asleep.    Update: 10/6/23  Tx 2/15    Patient reports she has been under a lot of stress at work to a point of \"picking her fingers\" and \"pulling her hair.\"  Neck and back spasms have also increased as a result.  She has taken Monday off to recover a bit but looking forward to taking a longer vacation.    Insurance visit 1 of 15 9/22/23     Patient reports she has been under a lot of stress lately due to work which has contributed to neck tightening. She also lost her dog of 13 years yesterday and feeling a bit sad. Additionally, she has been experiencing intermittent tingling from the middle of her right arm into her fingers. No anxiety lately, just stress primarily from work.        Initial intake 4/4/2022        Patient came in today seeking acupuncture for chronic low back pain, neck pain and anxiety.  Patient is a pleasant 30 y/o  who works from home in [employer] who c/o chronic low back, neck, midthoracic pain with anxiety d/t stress.     Anxiety with stress: Wedding date 5/28/22 and new home under construction and living in this home. New job is demanding. Manifests itself as physical pain, diarrhea and headaches. Wedding plans are not going well.      Low back pain; 2-3/10 pain level today. Has had daily chronic low back pain for 15 years through all of her higher education. Lately she has noticed that her mid thoracic and neck pain have worsened. Right hip pain which is new and with an onset of January 2022. Yoga and Pilates help with upper back but not with low back pain. She is constantly at her computer as an  working daily for full-time work.     History of chronic bilateral knee pain, right foot fracture and pain and right rib fracture. Diagnosed with " "osteopenia at 31 years old. Her physicians are working with her with calcium and phosphorus as well as vitamin D.. RYLEE positive. Familial history of autoimmune with RA, lupus, degenerative disc disease and vasculitis.     PCOS diagnosis and has no fertility goals. Does not use OBC due to history of severe complications many years ago.     \"Has terrible allergies \".     Headaches: daily, (R)>(L) temporal and post orbital. Pain is pressure and deep ache. 0/10 presently and VILLALAPNDO gets to 4/10 by day's end.      Dysmenorrhea: History of 15 years of menstrual cramps. This is cycle day 2 today with 8 out of 10 pain yesterday. Heat and naproxen helped reduce the pain so she was able to sleep. LMP: 4/3/2022. Heavy red bleeding for 1 to 2 days of cycle, tapers 2 to 3 days later. Cycles are 28 to 32 days averaging 30 days. Very floyd the week before the menstrual cycle begins.     Insomnia: hard to stay asleep, and at times hard to fall asleep, wakes with anxiety attacks and nightmares. Insomnia as part of the physical nature of her anxiety. The back pain and headaches with dysmenorrhea all are worse with increased anxiety.     Clonazepam prn and exercises daily at most with energy sufficient. Does exercise during meetings without having enough time. Yoga and PIlates.     Asthma: can get worse with aerobic exercise. Inhaler may be necessary.         "

## 2023-11-28 ENCOUNTER — APPOINTMENT (OUTPATIENT)
Dept: INTEGRATIVE MEDICINE | Facility: CLINIC | Age: 33
End: 2023-11-28
Payer: COMMERCIAL

## 2023-12-12 ENCOUNTER — PATIENT MESSAGE (OUTPATIENT)
Dept: PRIMARY CARE | Facility: CLINIC | Age: 33
End: 2023-12-12
Payer: COMMERCIAL

## 2023-12-12 DIAGNOSIS — Z00.00 HEALTHCARE MAINTENANCE: Primary | ICD-10-CM

## 2023-12-29 ENCOUNTER — LAB (OUTPATIENT)
Dept: LAB | Facility: LAB | Age: 33
End: 2023-12-29
Payer: COMMERCIAL

## 2023-12-29 DIAGNOSIS — Z00.00 HEALTHCARE MAINTENANCE: ICD-10-CM

## 2023-12-29 LAB
ALBUMIN SERPL BCP-MCNC: 4.2 G/DL (ref 3.4–5)
ALP SERPL-CCNC: 48 U/L (ref 33–110)
ALT SERPL W P-5'-P-CCNC: 9 U/L (ref 7–45)
ANION GAP SERPL CALC-SCNC: 8 MMOL/L (ref 10–20)
AST SERPL W P-5'-P-CCNC: 12 U/L (ref 9–39)
BILIRUB SERPL-MCNC: 0.3 MG/DL (ref 0–1.2)
BUN SERPL-MCNC: 15 MG/DL (ref 6–23)
CALCIUM SERPL-MCNC: 9 MG/DL (ref 8.6–10.3)
CHLORIDE SERPL-SCNC: 106 MMOL/L (ref 98–107)
CO2 SERPL-SCNC: 28 MMOL/L (ref 21–32)
CREAT SERPL-MCNC: 0.87 MG/DL (ref 0.5–1.05)
GFR SERPL CREATININE-BSD FRML MDRD: 89 ML/MIN/1.73M*2
GLUCOSE SERPL-MCNC: 85 MG/DL (ref 74–99)
POTASSIUM SERPL-SCNC: 4.1 MMOL/L (ref 3.5–5.3)
PROT SERPL-MCNC: 6.9 G/DL (ref 6.4–8.2)
SODIUM SERPL-SCNC: 138 MMOL/L (ref 136–145)

## 2023-12-29 PROCEDURE — 80053 COMPREHEN METABOLIC PANEL: CPT

## 2023-12-29 PROCEDURE — 36415 COLL VENOUS BLD VENIPUNCTURE: CPT

## 2024-03-14 ENCOUNTER — OFFICE VISIT (OUTPATIENT)
Dept: PRIMARY CARE | Facility: CLINIC | Age: 34
End: 2024-03-14
Payer: COMMERCIAL

## 2024-03-14 ENCOUNTER — LAB (OUTPATIENT)
Dept: LAB | Facility: LAB | Age: 34
End: 2024-03-14
Payer: COMMERCIAL

## 2024-03-14 ENCOUNTER — TELEPHONE (OUTPATIENT)
Dept: PRIMARY CARE | Facility: CLINIC | Age: 34
End: 2024-03-14

## 2024-03-14 VITALS
TEMPERATURE: 97.9 F | OXYGEN SATURATION: 99 % | HEIGHT: 67 IN | RESPIRATION RATE: 16 BRPM | HEART RATE: 71 BPM | SYSTOLIC BLOOD PRESSURE: 128 MMHG | WEIGHT: 146 LBS | BODY MASS INDEX: 22.91 KG/M2 | DIASTOLIC BLOOD PRESSURE: 70 MMHG

## 2024-03-14 DIAGNOSIS — M25.50 ARTHRALGIA, UNSPECIFIED JOINT: ICD-10-CM

## 2024-03-14 DIAGNOSIS — Z00.00 HEALTHCARE MAINTENANCE: ICD-10-CM

## 2024-03-14 DIAGNOSIS — M25.50 ARTHRALGIA, UNSPECIFIED JOINT: Primary | ICD-10-CM

## 2024-03-14 LAB
ALBUMIN SERPL BCP-MCNC: 4.1 G/DL (ref 3.4–5)
ALP SERPL-CCNC: 44 U/L (ref 33–110)
ALT SERPL W P-5'-P-CCNC: 10 U/L (ref 7–45)
ANION GAP SERPL CALC-SCNC: 9 MMOL/L (ref 10–20)
AST SERPL W P-5'-P-CCNC: 14 U/L (ref 9–39)
BASOPHILS # BLD AUTO: 0.04 X10*3/UL (ref 0–0.1)
BASOPHILS NFR BLD AUTO: 1 %
BILIRUB SERPL-MCNC: 0.6 MG/DL (ref 0–1.2)
BUN SERPL-MCNC: 10 MG/DL (ref 6–23)
CALCIUM SERPL-MCNC: 9.2 MG/DL (ref 8.6–10.6)
CCP IGG SERPL-ACNC: 1 U/ML
CHLORIDE SERPL-SCNC: 108 MMOL/L (ref 98–107)
CHOLEST SERPL-MCNC: 190 MG/DL (ref 0–199)
CHOLESTEROL/HDL RATIO: 3.6
CO2 SERPL-SCNC: 27 MMOL/L (ref 21–32)
CREAT SERPL-MCNC: 0.91 MG/DL (ref 0.5–1.05)
CRP SERPL-MCNC: <0.1 MG/DL
EGFRCR SERPLBLD CKD-EPI 2021: 85 ML/MIN/1.73M*2
EOSINOPHIL # BLD AUTO: 0.1 X10*3/UL (ref 0–0.7)
EOSINOPHIL NFR BLD AUTO: 2.4 %
ERYTHROCYTE [DISTWIDTH] IN BLOOD BY AUTOMATED COUNT: 13 % (ref 11.5–14.5)
ERYTHROCYTE [SEDIMENTATION RATE] IN BLOOD BY WESTERGREN METHOD: 4 MM/H (ref 0–20)
GLUCOSE SERPL-MCNC: 83 MG/DL (ref 74–99)
HCT VFR BLD AUTO: 43 % (ref 36–46)
HDLC SERPL-MCNC: 53.4 MG/DL
HGB BLD-MCNC: 13.8 G/DL (ref 12–16)
HIV 1+2 AB+HIV1 P24 AG SERPL QL IA: NONREACTIVE
IMM GRANULOCYTES # BLD AUTO: 0.01 X10*3/UL (ref 0–0.7)
IMM GRANULOCYTES NFR BLD AUTO: 0.2 % (ref 0–0.9)
LDLC SERPL CALC-MCNC: 124 MG/DL
LYMPHOCYTES # BLD AUTO: 1.61 X10*3/UL (ref 1.2–4.8)
LYMPHOCYTES NFR BLD AUTO: 39.2 %
MCH RBC QN AUTO: 29.9 PG (ref 26–34)
MCHC RBC AUTO-ENTMCNC: 32.1 G/DL (ref 32–36)
MCV RBC AUTO: 93 FL (ref 80–100)
MONOCYTES # BLD AUTO: 0.39 X10*3/UL (ref 0.1–1)
MONOCYTES NFR BLD AUTO: 9.5 %
NEUTROPHILS # BLD AUTO: 1.96 X10*3/UL (ref 1.2–7.7)
NEUTROPHILS NFR BLD AUTO: 47.7 %
NON HDL CHOLESTEROL: 137 MG/DL (ref 0–149)
NRBC BLD-RTO: 0 /100 WBCS (ref 0–0)
PLATELET # BLD AUTO: 304 X10*3/UL (ref 150–450)
POTASSIUM SERPL-SCNC: 4.1 MMOL/L (ref 3.5–5.3)
PROT SERPL-MCNC: 6.5 G/DL (ref 6.4–8.2)
RBC # BLD AUTO: 4.61 X10*6/UL (ref 4–5.2)
RHEUMATOID FACT SER NEPH-ACNC: <10 IU/ML (ref 0–15)
SODIUM SERPL-SCNC: 140 MMOL/L (ref 136–145)
TRIGL SERPL-MCNC: 61 MG/DL (ref 0–149)
TSH SERPL-ACNC: 1.94 MIU/L (ref 0.44–3.98)
VLDL: 12 MG/DL (ref 0–40)
WBC # BLD AUTO: 4.1 X10*3/UL (ref 4.4–11.3)

## 2024-03-14 PROCEDURE — 80061 LIPID PANEL: CPT

## 2024-03-14 PROCEDURE — 84443 ASSAY THYROID STIM HORMONE: CPT

## 2024-03-14 PROCEDURE — 86431 RHEUMATOID FACTOR QUANT: CPT

## 2024-03-14 PROCEDURE — 86038 ANTINUCLEAR ANTIBODIES: CPT

## 2024-03-14 PROCEDURE — 86140 C-REACTIVE PROTEIN: CPT

## 2024-03-14 PROCEDURE — 87389 HIV-1 AG W/HIV-1&-2 AB AG IA: CPT

## 2024-03-14 PROCEDURE — 1036F TOBACCO NON-USER: CPT | Performed by: INTERNAL MEDICINE

## 2024-03-14 PROCEDURE — 85652 RBC SED RATE AUTOMATED: CPT

## 2024-03-14 PROCEDURE — 87800 DETECT AGNT MULT DNA DIREC: CPT

## 2024-03-14 PROCEDURE — 99395 PREV VISIT EST AGE 18-39: CPT | Performed by: INTERNAL MEDICINE

## 2024-03-14 PROCEDURE — 80053 COMPREHEN METABOLIC PANEL: CPT

## 2024-03-14 PROCEDURE — 36415 COLL VENOUS BLD VENIPUNCTURE: CPT

## 2024-03-14 PROCEDURE — 86200 CCP ANTIBODY: CPT

## 2024-03-14 PROCEDURE — 85025 COMPLETE CBC W/AUTO DIFF WBC: CPT

## 2024-03-14 ASSESSMENT — ENCOUNTER SYMPTOMS
ABDOMINAL PAIN: 0
NERVOUS/ANXIOUS: 1
ABDOMINAL DISTENTION: 1
UNEXPECTED WEIGHT CHANGE: 0
SLEEP DISTURBANCE: 1
DIARRHEA: 1
DIAPHORESIS: 0
CONSTIPATION: 0

## 2024-03-14 ASSESSMENT — PATIENT HEALTH QUESTIONNAIRE - PHQ9
SUM OF ALL RESPONSES TO PHQ9 QUESTIONS 1 AND 2: 0
1. LITTLE INTEREST OR PLEASURE IN DOING THINGS: NOT AT ALL
2. FEELING DOWN, DEPRESSED OR HOPELESS: NOT AT ALL

## 2024-03-14 NOTE — PROGRESS NOTES
"Patient here for a physical     Subjective   Patient ID: Lindsay Mina is a 33 y.o. female who presents for Annual Exam.    The patient reports a facial rash for some time that family and friends have noticed.  This is associated with dry eyes in the morning that is described as \"sandpaper\".  The patient mentions that she previously had a positive RYLEE, and multiple family members have autoimmune conditions.    The patient mentions recent onset of abdominal bloating and diarrhea of three to four bowel movements throughout the daytime that she suspects may be associated with anxiety.  The patient has been waking up at night time with panic attacks resulting in sleep disturbance as well.    The patient notes feeling something abnormal in the axilla, though it is not described as a swelling.  She denies any breast changes, night sweats, weight loss, or known family history of breast cancer.  The patient has a family history of lung and brain cancer.     The patient denies any hearing impairment or vision changes, and wears prescription lenses.  She follows regularly with a Dentist.        Review of Systems   Constitutional:  Negative for diaphoresis and unexpected weight change.   HENT:  Negative for hearing loss.    Eyes:  Negative for visual disturbance.        Positive for dry eyes.   Gastrointestinal:  Positive for abdominal distention and diarrhea. Negative for abdominal pain and constipation.   Skin:         Negative for breast changes.   Psychiatric/Behavioral:  Positive for sleep disturbance. The patient is nervous/anxious.      Objective   Physical Exam  Constitutional:       Appearance: Normal appearance.   Neck:      Vascular: No carotid bruit.      Comments: No palpable cervical, supraclavicular, or axillary lymphadenopathy on exam.  Cardiovascular:      Rate and Rhythm: Normal rate and regular rhythm.      Heart sounds: Normal heart sounds.   Pulmonary:      Effort: Pulmonary effort is normal.      Breath " sounds: Normal breath sounds.   Abdominal:      General: Bowel sounds are normal.      Palpations: Abdomen is soft.      Tenderness: There is no abdominal tenderness.   Lymphadenopathy:      Cervical: No cervical adenopathy.   Skin:     General: Skin is warm and dry.   Neurological:      General: No focal deficit present.      Mental Status: She is alert and oriented to person, place, and time. Mental status is at baseline.   Psychiatric:         Mood and Affect: Mood normal.         Behavior: Behavior normal.         Assessment/Plan   Problem List Items Addressed This Visit    None  Visit Diagnoses         Codes    Arthralgia, unspecified joint    -  Primary M25.50    Relevant Orders    RYLEE with Reflex to AURE    Sedimentation Rate    C-reactive protein    Rheumatoid Factor    Citrulline Antibody, IgG    Healthcare maintenance     Z00.00    Relevant Orders    Comprehensive metabolic panel    CBC and Auto Differential    Lipid panel    Tsh With Reflex To Free T4 If Abnormal    HIV 1/2 Antigen/Antibody Screen with Reflex to Confirmation    C. trachomatis / N. gonorrhoeae, DNA probe            CPE Performed  -  Discussed healthy diet and regular exercise.    -  Physical exam overall unremarkable. Immunizations reviewed and updated accordingly. Healthy lifestyle choices discussed (tobacco avoidance, appropriate alcohol use, avoidance of illicit substances).   -  Patient is wearing seatbelt.   -  Screening lab work ordered as indicated.    -  Age appropriate screening tests reviewed with patient.        IMPRESSIONS/PLAN:     Facial Rash/Sicca  - Ordered CBC with differential, CMP, ESR, CRP, Citrulline Antibody, RYLEE with AURE reflex, and RF. Will consider management pending results.    STI testing  - Ordered C.trachomatis/N.gonorrheae DNA probe, HIV 1/2 Antigen/Antibody screen with reflex confirmation.      Diarrhea  - Advised patient to start Metamucil supplementation as 0.5 teaspoon in glass of water once daily for the  "first week, to be increased to full teaspoon if necessary thereafter. Call the clinic if symptoms persist or worsen.     Sleep Disturbance  - Advised the patient to try Sleep3 Nature's Bounty Melatonin over the counter.  Call the clinic if symptoms persist or worsen.     Axillary \"Abnormality\"  - No palpable cervical, supraclavicular, or axillary lymphadenopathy.  Advised patient to call the clinic with any breast changes or palpable swelling in the axillae or otherwise, and will pursue management then.  Patient verbalized agreement with this plan.    /70 in the office today.     Asthma  - Maintained with albuterol 90 mcg/actuation inhaler prn and albuterol 2.5mg/3ml nebulizer solution.        Muscle Spasm  - Maintained with cyclobenzaprine 10 mg at bedtime prn.     Health Maintenance  - Routine labs ordered including CBC, CMP, and a lipid panel to be completed in the fasting state.  Added TSH.     Follow-up according to routine health maintenance, call sooner if needed.       Scribe Attestation  By signing my name below, I, Milan Drake   attest that this documentation has been prepared under the direction and in the presence of Kee Dill DO.   Eufemia Wakefield 03/14/24 8:42 AM   "

## 2024-03-15 LAB
ANA SER QL HEP2 SUBST: NEGATIVE
C TRACH RRNA SPEC QL NAA+PROBE: NEGATIVE
N GONORRHOEA DNA SPEC QL PROBE+SIG AMP: NEGATIVE

## 2024-07-02 ENCOUNTER — PATIENT MESSAGE (OUTPATIENT)
Dept: PRIMARY CARE | Facility: CLINIC | Age: 34
End: 2024-07-02
Payer: COMMERCIAL

## 2024-07-02 DIAGNOSIS — B37.9 YEAST INFECTION: Primary | ICD-10-CM

## 2024-07-02 RX ORDER — FLUCONAZOLE 150 MG/1
TABLET ORAL
Qty: 2 TABLET | Refills: 0 | Status: SHIPPED | OUTPATIENT
Start: 2024-07-02

## 2024-09-20 ENCOUNTER — APPOINTMENT (OUTPATIENT)
Dept: INTEGRATIVE MEDICINE | Facility: CLINIC | Age: 34
End: 2024-09-20
Payer: COMMERCIAL

## 2024-10-08 ENCOUNTER — OFFICE VISIT (OUTPATIENT)
Dept: PRIMARY CARE | Facility: CLINIC | Age: 34
End: 2024-10-08
Payer: COMMERCIAL

## 2024-10-08 VITALS
HEART RATE: 76 BPM | SYSTOLIC BLOOD PRESSURE: 128 MMHG | OXYGEN SATURATION: 98 % | DIASTOLIC BLOOD PRESSURE: 70 MMHG | RESPIRATION RATE: 16 BRPM | WEIGHT: 153 LBS | TEMPERATURE: 98 F | BODY MASS INDEX: 23.96 KG/M2

## 2024-10-08 DIAGNOSIS — J02.9 SORE THROAT: Primary | ICD-10-CM

## 2024-10-08 LAB — POC RAPID STREP: NEGATIVE

## 2024-10-08 PROCEDURE — 99213 OFFICE O/P EST LOW 20 MIN: CPT | Performed by: INTERNAL MEDICINE

## 2024-10-08 PROCEDURE — 1036F TOBACCO NON-USER: CPT | Performed by: INTERNAL MEDICINE

## 2024-10-08 PROCEDURE — 87880 STREP A ASSAY W/OPTIC: CPT | Performed by: INTERNAL MEDICINE

## 2024-10-08 RX ORDER — AMOXICILLIN 875 MG/1
875 TABLET, FILM COATED ORAL 2 TIMES DAILY
Qty: 20 TABLET | Refills: 0 | Status: SHIPPED | OUTPATIENT
Start: 2024-10-08 | End: 2024-10-18

## 2024-10-08 RX ORDER — PREDNISONE 20 MG/1
40 TABLET ORAL DAILY
Qty: 10 TABLET | Refills: 0 | Status: SHIPPED | OUTPATIENT
Start: 2024-10-08 | End: 2024-10-13

## 2024-10-08 ASSESSMENT — PATIENT HEALTH QUESTIONNAIRE - PHQ9
2. FEELING DOWN, DEPRESSED OR HOPELESS: NOT AT ALL
1. LITTLE INTEREST OR PLEASURE IN DOING THINGS: NOT AT ALL
SUM OF ALL RESPONSES TO PHQ9 QUESTIONS 1 AND 2: 0

## 2024-10-08 ASSESSMENT — ENCOUNTER SYMPTOMS: SORE THROAT: 1

## 2024-10-08 NOTE — PROGRESS NOTES
Patient here for a sore throat- negative COVID     Subjective   Patient ID: Lindsay Mina is a 34 y.o. female who presents for Sore Throat.    The patient reports sore throat with mild nasal congestion since yesterday.  She has tested negative for Covid-19 at home.      Sore Throat   Associated symptoms include congestion.     Review of Systems   HENT:  Positive for congestion and sore throat.      Objective   Physical Exam  Constitutional:       Appearance: Normal appearance.   HENT:      Right Ear: Tympanic membrane and ear canal normal. There is no impacted cerumen.      Left Ear: Tympanic membrane and ear canal normal. There is no impacted cerumen.   Neck:      Vascular: No carotid bruit.   Cardiovascular:      Rate and Rhythm: Normal rate and regular rhythm.      Heart sounds: Normal heart sounds.   Pulmonary:      Effort: Pulmonary effort is normal.      Breath sounds: Normal breath sounds.   Abdominal:      General: Bowel sounds are normal.      Palpations: Abdomen is soft.      Tenderness: There is no abdominal tenderness.   Skin:     General: Skin is warm and dry.   Neurological:      General: No focal deficit present.      Mental Status: She is alert and oriented to person, place, and time. Mental status is at baseline.   Psychiatric:         Mood and Affect: Mood normal.         Behavior: Behavior normal.         Assessment/Plan   Problem List Items Addressed This Visit    None  Visit Diagnoses         Codes    Sore throat    -  Primary J02.9    Relevant Medications    predniSONE (Deltasone) 20 mg tablet    amoxicillin (Amoxil) 875 mg tablet    Other Relevant Orders    POCT Rapid Strep A manually resulted (Completed)            IMPRESSIONS/PLAN:     Sore Throat  - IO Rapid Strep A negative.  No fever.  Visible aphthous ulcers.  Prescribed amoxicillin 875mg twice daily to be taken with food to prevent adverse effects.  Also prescribed prednisone 20mg as two tablets once daily for five days.  Patient okay  to add on Tylenol as directed.  Advised patient to try Cepacol lozenges OTC as directed on packaging for symptom relief.  Drink plenty of water to remain well hydrated, and rest as much as possible.  Call the clinic if symptoms persist or worsen.     /70 in the office today.     Sleep Disturbance  - Advised the patient to try Sleep3 Nature's Bounty Melatonin over the counter.  Call the clinic if symptoms persist or worsen.     Asthma  - Maintained with albuterol 90 mcg/actuation inhaler prn and albuterol 2.5mg/3ml nebulizer solution.       Health Maintenance  - Routine labs 3/2024.     Follow-up according to routine health maintenance, call sooner if needed.       Scribe Attestation  By signing my name below, I, Milan Drake   attest that this documentation has been prepared under the direction and in the presence of Kee Dill DO.   Eufemia Wakefield 10/08/24 1:15 PM

## 2025-06-09 ENCOUNTER — HOSPITAL ENCOUNTER (OUTPATIENT)
Dept: RADIOLOGY | Facility: CLINIC | Age: 35
Discharge: HOME | End: 2025-06-09
Payer: COMMERCIAL

## 2025-06-09 ENCOUNTER — PATIENT MESSAGE (OUTPATIENT)
Dept: PRIMARY CARE | Facility: CLINIC | Age: 35
End: 2025-06-09
Payer: COMMERCIAL
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
## 2025-06-09 PROCEDURE — 72220 X-RAY EXAM SACRUM TAILBONE: CPT | Performed by: RADIOLOGY

## 2025-06-09 PROCEDURE — 72220 X-RAY EXAM SACRUM TAILBONE: CPT

## 2025-08-15 ENCOUNTER — APPOINTMENT (OUTPATIENT)
Dept: INTEGRATIVE MEDICINE | Facility: CLINIC | Age: 35
End: 2025-08-15
Payer: COMMERCIAL

## 2025-08-15 DIAGNOSIS — M54.2 NECK PAIN, CHRONIC: ICD-10-CM

## 2025-08-15 DIAGNOSIS — G89.29 NECK PAIN, CHRONIC: ICD-10-CM

## 2025-08-15 DIAGNOSIS — M54.59 OTHER LOW BACK PAIN: Primary | ICD-10-CM

## 2025-08-15 DIAGNOSIS — F41.9 ANXIETY: ICD-10-CM

## 2025-08-15 PROCEDURE — 97811 ACUP 1/> W/O ESTIM EA ADD 15: CPT

## 2025-08-15 PROCEDURE — 97810 ACUP 1/> WO ESTIM 1ST 15 MIN: CPT
